# Patient Record
Sex: FEMALE | Race: WHITE | NOT HISPANIC OR LATINO
[De-identification: names, ages, dates, MRNs, and addresses within clinical notes are randomized per-mention and may not be internally consistent; named-entity substitution may affect disease eponyms.]

---

## 2019-09-23 ENCOUNTER — TRANSCRIPTION ENCOUNTER (OUTPATIENT)
Age: 84
End: 2019-09-23

## 2022-04-04 ENCOUNTER — APPOINTMENT (OUTPATIENT)
Dept: ORTHOPEDIC SURGERY | Facility: CLINIC | Age: 87
End: 2022-04-04
Payer: MEDICARE

## 2022-04-04 DIAGNOSIS — E78.00 PURE HYPERCHOLESTEROLEMIA, UNSPECIFIED: ICD-10-CM

## 2022-04-04 DIAGNOSIS — I10 ESSENTIAL (PRIMARY) HYPERTENSION: ICD-10-CM

## 2022-04-04 PROBLEM — Z00.00 ENCOUNTER FOR PREVENTIVE HEALTH EXAMINATION: Noted: 2022-04-04

## 2022-04-04 PROCEDURE — 20610 DRAIN/INJ JOINT/BURSA W/O US: CPT | Mod: RT

## 2022-04-04 NOTE — PROCEDURE
[Large Joint Injection] : Large joint injection [Right] : of the right [Pain] : pain [Inflammation] : inflammation [Alcohol] : alcohol [Betadine] : betadine [Ethyl Chloride sprayed topically] : ethyl chloride sprayed topically [Sterile technique used] : sterile technique used [Orthovisc] : Orthovisc [#2] : series #2 [Patient had decreased mobility in the joint] : patient had decreased mobility in the joint [Risks, benefits, alternatives discussed / Verbal consent obtained] : the risks benefits, and alternatives have been discussed, and verbal consent was obtained

## 2022-04-04 NOTE — PHYSICAL EXAM
[Right] : right knee [] : crepitus about the patella [5___] : hamstring 5[unfilled]/5 [TWNoteComboBox7] : flexion 110 degrees [de-identified] : extension 0 degrees

## 2022-04-04 NOTE — HISTORY OF PRESENT ILLNESS
[Right Leg] : right leg [7] : 7 [8] : 8 [Dull/Aching] : dull/aching [Intermittent] : intermittent [Nothing helps with pain getting better] : Nothing helps with pain getting better [Sitting] : sitting [Retired] : Work status: retired [2] : 2 [Orthovisc] : Orthovisc [] : no [de-identified] : driving [de-identified] : xrays, mri [de-identified] : none [de-identified] : 3/28/22

## 2022-04-11 ENCOUNTER — APPOINTMENT (OUTPATIENT)
Dept: ORTHOPEDIC SURGERY | Facility: CLINIC | Age: 87
End: 2022-04-11
Payer: MEDICARE

## 2022-04-11 VITALS — BODY MASS INDEX: 24.74 KG/M2 | HEIGHT: 60 IN | WEIGHT: 126 LBS

## 2022-04-11 PROCEDURE — 20611 DRAIN/INJ JOINT/BURSA W/US: CPT | Mod: RT

## 2022-04-11 RX ORDER — ATORVASTATIN CALCIUM 20 MG/1
20 TABLET, FILM COATED ORAL
Qty: 90 | Refills: 0 | Status: ACTIVE | COMMUNITY
Start: 2021-10-26

## 2022-04-11 RX ORDER — LOSARTAN POTASSIUM 100 MG/1
100 TABLET, FILM COATED ORAL
Qty: 90 | Refills: 0 | Status: ACTIVE | COMMUNITY
Start: 2022-01-17

## 2022-04-11 RX ORDER — DULOXETINE HYDROCHLORIDE 30 MG/1
30 CAPSULE, DELAYED RELEASE PELLETS ORAL
Qty: 7 | Refills: 0 | Status: ACTIVE | COMMUNITY
Start: 2021-12-10

## 2022-04-11 RX ORDER — NAPROXEN SODIUM 220 MG
220 TABLET ORAL
Qty: 45 | Refills: 0 | Status: ACTIVE | COMMUNITY
Start: 2021-12-10

## 2022-04-11 RX ORDER — OXCARBAZEPINE 300 MG/1
300 TABLET, FILM COATED ORAL
Qty: 30 | Refills: 0 | Status: ACTIVE | COMMUNITY
Start: 2022-03-17

## 2022-04-11 RX ORDER — DULOXETINE HYDROCHLORIDE 60 MG/1
60 CAPSULE, DELAYED RELEASE PELLETS ORAL
Qty: 30 | Refills: 0 | Status: ACTIVE | COMMUNITY
Start: 2021-12-10

## 2022-04-11 NOTE — PHYSICAL EXAM
[Right] : right knee [] : crepitus about the patella [5___] : hamstring 5[unfilled]/5 [TWNoteComboBox7] : flexion 110 degrees [de-identified] : extension 0 degrees

## 2022-04-11 NOTE — PROCEDURE
[Large Joint Injection] : Large joint injection [Right] : of the right [Pain] : pain [Inflammation] : inflammation [Alcohol] : alcohol [Betadine] : betadine [Ethyl Chloride sprayed topically] : ethyl chloride sprayed topically [Sterile technique used] : sterile technique used [Orthovisc] : Orthovisc [#3] : series #3 [Patient had decreased mobility in the joint] : patient had decreased mobility in the joint [Risks, benefits, alternatives discussed / Verbal consent obtained] : the risks benefits, and alternatives have been discussed, and verbal consent was obtained

## 2022-04-11 NOTE — DISCUSSION/SUMMARY
[de-identified] : Orthovisc tolerated well.\par \par Entered by DOMINICK Webb acting as scribe.\par \par -\par The documentation recorded by the scribe accurately reflects the service I personally performed and the decisions made by me.\par

## 2022-04-11 NOTE — HISTORY OF PRESENT ILLNESS
[3] : 3 [Orthovisc] : Orthovisc [de-identified] : #3 orthovisc [] : no [FreeTextEntry1] : right knee [de-identified] : 4/4/22 [TWNoteComboBox1] : 0%

## 2022-04-18 ENCOUNTER — APPOINTMENT (OUTPATIENT)
Dept: ORTHOPEDIC SURGERY | Facility: CLINIC | Age: 87
End: 2022-04-18
Payer: MEDICARE

## 2022-04-18 VITALS — BODY MASS INDEX: 17.64 KG/M2 | HEIGHT: 71 IN | WEIGHT: 126 LBS

## 2022-04-18 PROCEDURE — 20611 DRAIN/INJ JOINT/BURSA W/US: CPT | Mod: RT

## 2022-04-18 PROCEDURE — 99024 POSTOP FOLLOW-UP VISIT: CPT

## 2022-04-18 NOTE — DISCUSSION/SUMMARY
[de-identified] : Orthovisc tolerated well.\par \par Entered by DOMINICK Webb acting as scribe.\par \par -\par The documentation recorded by the scribe accurately reflects the service I personally performed and the decisions made by me.\par

## 2022-04-18 NOTE — PROCEDURE
[Large Joint Injection] : Large joint injection [Right] : of the right [Pain] : pain [Inflammation] : inflammation [Alcohol] : alcohol [Betadine] : betadine [Ethyl Chloride sprayed topically] : ethyl chloride sprayed topically [Sterile technique used] : sterile technique used [Orthovisc] : Orthovisc [#4] : series #4 [Call if redness, pain or fever occur] : call if redness, pain or fever occur [Apply ice for 15min out of every hour for the next 12-24 hours as tolerated] : apply ice for 15 minutes out of every hour for the next 12-24 hours as tolerated [Patient had decreased mobility in the joint] : patient had decreased mobility in the joint [Risks, benefits, alternatives discussed / Verbal consent obtained] : the risks benefits, and alternatives have been discussed, and verbal consent was obtained [Prior failure or difficult injection] : prior failure or difficult injection [All ultrasound images have been permanently captured and stored accordingly in our picture archiving and communication system] : All ultrasound images have been permanently captured and stored accordingly in our picture archiving and communication system [Visualization of the needle and placement of injection was performed without complication] : visualization of the needle and placement of injection was performed without complication

## 2022-04-18 NOTE — HISTORY OF PRESENT ILLNESS
[4] : 4 [Orthovisc] : Orthovisc [de-identified] : Patient is here for her last injection right knee [] : no [FreeTextEntry1] : right knee [de-identified] : 4/11/2022 [TWNoteComboBox1] : 20%

## 2022-04-18 NOTE — PHYSICAL EXAM
[Right] : right knee [] : crepitus about the patella [5___] : hamstring 5[unfilled]/5 [TWNoteComboBox7] : flexion 110 degrees [de-identified] : extension 0 degrees

## 2022-05-02 ENCOUNTER — APPOINTMENT (OUTPATIENT)
Dept: ORTHOPEDIC SURGERY | Facility: CLINIC | Age: 87
End: 2022-05-02
Payer: MEDICARE

## 2022-05-02 VITALS — BODY MASS INDEX: 24.74 KG/M2 | WEIGHT: 126 LBS | HEIGHT: 60 IN

## 2022-05-02 DIAGNOSIS — M19.90 UNSPECIFIED OSTEOARTHRITIS, UNSPECIFIED SITE: ICD-10-CM

## 2022-05-02 PROCEDURE — 99213 OFFICE O/P EST LOW 20 MIN: CPT

## 2022-05-02 PROCEDURE — 73130 X-RAY EXAM OF HAND: CPT | Mod: RT

## 2022-05-02 PROCEDURE — L3807: CPT

## 2022-05-02 RX ORDER — DICLOFENAC SODIUM 1% 10 MG/G
1 GEL TOPICAL DAILY
Qty: 1 | Refills: 3 | Status: ACTIVE | COMMUNITY
Start: 2022-05-02 | End: 1900-01-01

## 2022-05-04 NOTE — HISTORY OF PRESENT ILLNESS
[Right Arm] : right arm [Gradual] : gradual [5] : 5 [0] : 0 [Dull/Aching] : dull/aching [Localized] : localized [Intermittent] : intermittent [Household chores] : household chores [Rest] : rest [de-identified] : 5/2/2022: Pt here with complaint of right thumb pain with grasping/lifting  1 year\par Pt denies n/t.\par \par PMH: oa, htn, hyperlipidemia.\par Allergies: NKDA.\par  [] : no [FreeTextEntry1] : right hand/thumb [FreeTextEntry3] : 6+ months ago [FreeTextEntry5] : No accident has occurred. Pt has arthritis [de-identified] : activity [de-identified] : 4/18/22 [de-identified] : xr [de-identified] :

## 2022-05-04 NOTE — ASSESSMENT
[FreeTextEntry1] : Conservative vs surgical treatment discussed in detail with patient.\par Pt has opted for bracing (provided [employer] brace today)\par Voltaren gel qid prn pain provided today.\par Activity modification discussed. \par \par The patient was advised of the diagnosis. The natural history of the pathology was explained in full to the patient in layman's terms. We reviewed that the forces applied to the thumb tip are magnified 12-14 times at the thumb cmc joint.  We also reviewed the progression of arthritis with early arthritis showing minimal to no xray changes.  We discussed treatment options, including activity modification(demonstrated), medicine(topical and oral), bracing, therapy, injection and surgery.  We reviewed the r/b of each and post op expectations/course.  All questions were answered and the patient verbalized understanding\par \par \par Pt will rto prn.\par

## 2022-05-04 NOTE — PHYSICAL EXAM
[de-identified] : Right thumb cmc swelling and ttp.\par Basal Joint Grind Test is postive.\par All digits are nvi and with FAROM. \par  and intrinsic strength is 5/5. \par Finklestein Test is negative.\par CT compression is negative.\par Gutiérrez Test is negative. \par

## 2022-05-10 PROBLEM — Z00.00 ENCOUNTER FOR PREVENTIVE HEALTH EXAMINATION: Status: ACTIVE | Noted: 2022-05-10

## 2022-09-15 ENCOUNTER — APPOINTMENT (OUTPATIENT)
Dept: ORTHOPEDIC SURGERY | Facility: CLINIC | Age: 87
End: 2022-09-15

## 2022-09-15 VITALS — BODY MASS INDEX: 25 KG/M2 | WEIGHT: 124 LBS | HEIGHT: 59 IN

## 2022-09-15 DIAGNOSIS — M18.11 UNILATERAL PRIMARY OSTEOARTHRITIS OF FIRST CARPOMETACARPAL JOINT, RIGHT HAND: ICD-10-CM

## 2022-09-15 DIAGNOSIS — M17.11 UNILATERAL PRIMARY OSTEOARTHRITIS, RIGHT KNEE: ICD-10-CM

## 2022-09-15 PROCEDURE — 99213 OFFICE O/P EST LOW 20 MIN: CPT

## 2022-09-15 PROCEDURE — 73600 X-RAY EXAM OF ANKLE: CPT | Mod: LT

## 2022-09-15 PROCEDURE — 73630 X-RAY EXAM OF FOOT: CPT | Mod: LT

## 2022-09-15 NOTE — DATA REVIEWED
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

## 2022-09-15 NOTE — HISTORY OF PRESENT ILLNESS
[6] : 6 [0] : 0 [Dull/Aching] : dull/aching [Localized] : localized [Retired] : Work status: retired [de-identified] : 9/15/22: L ankle swelling over years; she denies injury. Had had several MRIs/imaging over the years. Pain at times shoots across the ankle with ambulation. She has been elevating and using a compression brace. Hx left foot cheilectomy/prox phalanx osteotomy in 2011. Pain continues to the big toe and ball of the foot.  [] : Post Surgical Visit: no [FreeTextEntry1] : L Ankle [FreeTextEntry3] : N/A Chronic [FreeTextEntry5] : 86 Y/O RHD F Eval L ankle NKI Chronic pain with no associated trauma pt states prior TX of MRI  [de-identified] : MRI  [de-identified] : [place]

## 2022-09-15 NOTE — DISCUSSION/SUMMARY
[de-identified] : Vascular eval\par Probable referred pain from back/Chiropractor\par Shoe moifications

## 2022-09-15 NOTE — PHYSICAL EXAM
[Left] : left foot and ankle [Mild] : mild swelling of MTP joint/great toe [1st] : 1st [5___] : plantar flexion 5[unfilled]/5 [2+] : dorsalis pedis pulse: 2+ [] : no calf tenderness

## 2022-10-27 ENCOUNTER — APPOINTMENT (OUTPATIENT)
Dept: ORTHOPEDIC SURGERY | Facility: CLINIC | Age: 87
End: 2022-10-27

## 2023-02-14 ENCOUNTER — APPOINTMENT (OUTPATIENT)
Dept: ORTHOPEDIC SURGERY | Facility: CLINIC | Age: 88
End: 2023-02-14
Payer: MEDICARE

## 2023-02-14 PROCEDURE — J3490M: CUSTOM | Mod: NC

## 2023-02-14 PROCEDURE — 99213 OFFICE O/P EST LOW 20 MIN: CPT | Mod: 25

## 2023-02-14 PROCEDURE — 20605 DRAIN/INJ JOINT/BURSA W/O US: CPT

## 2023-02-14 NOTE — PROCEDURE
[Medium Joint Injection] : medium joint injection [Left] : of the left [Subtalar Joint] : subtalar joint [Pain] : pain [Inflammation] : inflammation [Repeat series performed] : repeat series performed [Alcohol] : alcohol [Ethyl Chloride sprayed topically] : ethyl chloride sprayed topically [Sterile technique used] : sterile technique used [___ cc    6mg] :  Betamethasone (Celestone) ~Vcc of 6mg [___ cc    1%] : Lidocaine ~Vcc of 1%  [___ cc    0.25%] : Bupivacaine (Marcaine) ~Vcc of 0.25%  [Call if redness, pain or fever occur] : call if redness, pain or fever occur [Apply ice for 15min out of every hour for the next 12-24 hours as tolerated] : apply ice for 15 minutes out of every hour for the next 12-24 hours as tolerated

## 2023-02-14 NOTE — PHYSICAL EXAM
[Left] : left foot and ankle [Mild] : mild swelling of MTP joint/great toe [1st] : 1st [5___] : plantar flexion 5[unfilled]/5 [2+] : dorsalis pedis pulse: 2+ [] : ambulation with cane

## 2023-02-14 NOTE — HISTORY OF PRESENT ILLNESS
[6] : 6 [0] : 0 [Dull/Aching] : dull/aching [Localized] : localized [Retired] : Work status: retired [Leisure] : leisure [Rest] : rest [Walking] : walking [de-identified] : 9/15/22: L ankle swelling over years; she denies injury. Had had several MRIs/imaging over the years. Pain at times shoots across the ankle with ambulation. She has been elevating and using a compression brace. Hx left foot cheilectomy/prox phalanx osteotomy in 2011. Pain continues to the big toe and ball of the foot. \par \par 2/14/23: F/U L ankle- Pt reports it feeling worse, vascular eval; wearing compression stockings. Difficulty walking. Couldn't tolerate brace for one day. Possible pain from back/Chiropractor. WB in sneakers w/ cane. PT/HEP  Considering injection [] : Post Surgical Visit: no [FreeTextEntry1] : L Ankle [FreeTextEntry3] : N/A Chronic [FreeTextEntry5] : 88 Y/O RHD F Eval L ankle NKI Chronic pain with no associated trauma pt states prior TX of MRI  [de-identified] : MRI  [de-identified] : [place]

## 2023-04-25 ENCOUNTER — APPOINTMENT (OUTPATIENT)
Dept: ORTHOPEDIC SURGERY | Facility: CLINIC | Age: 88
End: 2023-04-25
Payer: MEDICARE

## 2023-04-25 VITALS — HEIGHT: 59 IN | BODY MASS INDEX: 25 KG/M2 | WEIGHT: 124 LBS

## 2023-04-25 DIAGNOSIS — Z98.1 ARTHRODESIS STATUS: ICD-10-CM

## 2023-04-25 DIAGNOSIS — M48.061 SPINAL STENOSIS, LUMBAR REGION WITHOUT NEUROGENIC CLAUDICATION: ICD-10-CM

## 2023-04-25 PROCEDURE — 99213 OFFICE O/P EST LOW 20 MIN: CPT

## 2023-04-25 NOTE — DATA REVIEWED
[Outside X-rays] : outside x-rays [Lumbar Spine] : lumbar spine [FreeTextEntry1] : 4/22/21: L5-S1 discectomy spacer placement posterior fusion with intact hardware. Z98.89\par \par \par Mild multilevel lumbar degenerative changes..

## 2023-04-25 NOTE — HISTORY OF PRESENT ILLNESS
[6] : 6 [0] : 0 [Dull/Aching] : dull/aching [Localized] : localized [Leisure] : leisure [Rest] : rest [Walking] : walking [Retired] : Work status: retired [de-identified] : 9/15/22: L ankle swelling over years; she denies injury. Had had several MRIs/imaging over the years. Pain at times shoots across the ankle with ambulation. She has been elevating and using a compression brace. Hx left foot cheilectomy/prox phalanx osteotomy in 2011. Pain continues to the big toe and ball of the foot. \par \par 2/14/23: F/U L ankle- Pt reports it feeling worse, vascular eval; wearing compression stockings. Difficulty walking. Couldn't tolerate brace for one day. Possible pain from back/Chiropractor. WB in sneakers w/ cane. PT/HEP  Considering injection\par \par 4/25/23: f/u L ankle; better with PT/Bracing. Difficulty with prolonged walking, also with back pain. No relief with sinus tarsi CSI last visit.  [] : Post Surgical Visit: no [FreeTextEntry1] : L Ankle [FreeTextEntry3] : N/A Chronic [FreeTextEntry5] : 88 Y/O RHD F Eval L ankle NKI Chronic pain with no associated trauma pt states prior TX of MRI  [de-identified] : PHYSICAL THERAPY [de-identified] : [place]

## 2023-04-25 NOTE — DISCUSSION/SUMMARY
[de-identified] : Will continue PT. HEP encouraged.\par WBAT in brace\par Would like to review recent MRI L-spine. can consider EMG LE\par f/u 6 weeks

## 2023-06-13 ENCOUNTER — APPOINTMENT (OUTPATIENT)
Dept: ORTHOPEDIC SURGERY | Facility: CLINIC | Age: 88
End: 2023-06-13
Payer: MEDICARE

## 2023-06-13 VITALS — WEIGHT: 124 LBS | HEIGHT: 59 IN | BODY MASS INDEX: 25 KG/M2

## 2023-06-13 DIAGNOSIS — M25.572 PAIN IN LEFT ANKLE AND JOINTS OF LEFT FOOT: ICD-10-CM

## 2023-06-13 PROCEDURE — 99213 OFFICE O/P EST LOW 20 MIN: CPT

## 2023-06-13 NOTE — HISTORY OF PRESENT ILLNESS
[5] : 5 [0] : 0 [Dull/Aching] : dull/aching [Localized] : localized [Leisure] : leisure [Rest] : rest [Walking] : walking [Retired] : Work status: retired [de-identified] : 9/15/22: L ankle swelling over years; she denies injury. Had had several MRIs/imaging over the years. Pain at times shoots across the ankle with ambulation. She has been elevating and using a compression brace. Hx left foot cheilectomy/prox phalanx osteotomy in 2011. Pain continues to the big toe and ball of the foot. \par 2/14/23: F/U L ankle- Pt reports it feeling worse, vascular eval; wearing compression stockings. Difficulty walking. Couldn't tolerate brace for one day. Possible pain from back/Chiropractor. WB in sneakers w/ cane. PT/HEP  Considering injection\par 4/25/23: f/u L ankle; better with PT/Bracing. Difficulty with prolonged walking, also with back pain. No relief with sinus tarsi CSI last visit. \par 6/13/23  f/u L ankle  brace/PT  Back issues [] : Post Surgical Visit: no [FreeTextEntry1] : L Ankle [FreeTextEntry3] : N/A Chronic [FreeTextEntry5] : 86 Y/O RHD F Eval L ankle NKI Chronic pain with no associated trauma pt states prior TX of MRI  [de-identified] : PHYSICAL THERAPY [de-identified] : [place]

## 2023-06-13 NOTE — DISCUSSION/SUMMARY
[de-identified] : Will continue PT. HEP encouraged.\par WBAT in brace\par Would like to review recent MRI L-spine. can consider EMG LE\par f/u 6 weeks

## 2023-08-22 ENCOUNTER — APPOINTMENT (OUTPATIENT)
Dept: ORTHOPEDIC SURGERY | Facility: CLINIC | Age: 88
End: 2023-08-22
